# Patient Record
Sex: FEMALE | URBAN - METROPOLITAN AREA
[De-identification: names, ages, dates, MRNs, and addresses within clinical notes are randomized per-mention and may not be internally consistent; named-entity substitution may affect disease eponyms.]

---

## 2019-01-30 ENCOUNTER — APPOINTMENT (RX ONLY)
Dept: URBAN - METROPOLITAN AREA CLINIC 176 | Facility: CLINIC | Age: 34
Setting detail: DERMATOLOGY
End: 2019-01-30

## 2019-01-30 VITALS — WEIGHT: 110 LBS | HEIGHT: 62 IN

## 2019-01-30 DIAGNOSIS — L57.8 OTHER SKIN CHANGES DUE TO CHRONIC EXPOSURE TO NONIONIZING RADIATION: ICD-10-CM

## 2019-01-30 PROBLEM — D23.5 OTHER BENIGN NEOPLASM OF SKIN OF TRUNK: Status: ACTIVE | Noted: 2019-01-30

## 2019-01-30 PROCEDURE — ? SUNSCREEN RECOMMENDATIONS

## 2019-01-30 PROCEDURE — ? SHAVE REMOVAL (NO PATHOLOGY)

## 2019-01-30 PROCEDURE — ? ADDITIONAL NOTES

## 2019-01-30 ASSESSMENT — LOCATION SIMPLE DESCRIPTION DERM
LOCATION SIMPLE: RIGHT CHEEK
LOCATION SIMPLE: LEFT CHEEK
LOCATION SIMPLE: LEFT FOREARM
LOCATION SIMPLE: RIGHT FOREARM

## 2019-01-30 ASSESSMENT — LOCATION DETAILED DESCRIPTION DERM
LOCATION DETAILED: RIGHT INFERIOR CENTRAL MALAR CHEEK
LOCATION DETAILED: LEFT CENTRAL MALAR CHEEK
LOCATION DETAILED: LEFT PROXIMAL DORSAL FOREARM
LOCATION DETAILED: RIGHT DISTAL RADIAL DORSAL FOREARM

## 2019-01-30 ASSESSMENT — LOCATION ZONE DERM
LOCATION ZONE: ARM
LOCATION ZONE: FACE

## 2019-01-30 NOTE — HPI: SCAR (COMPLEX), CHEST
How Severe Is The Scar?: moderate
What Are Your Concerns? (Check All That Apply): size
Is This A New Presentation, Or A Follow-Up?: Chest Scar

## 2019-01-30 NOTE — PROCEDURE: SHAVE REMOVAL (NO PATHOLOGY)
Hemostasis: Electrocautery
Bill For Surgical Tray: no
Detail Level: Simple
Path Notes (To The Dermatopathologist): Please check margins.
Medical Necessity Information: It is in your best interest to select a reason for this procedure from the list below. All of these items fulfill various CMS LCD requirements except the new and changing color options.
Consent was obtained from the patient. The risks and benefits to therapy were discussed in detail. Specifically, the risks of infection, scarring, bleeding, prolonged wound healing, incomplete removal, allergy to anesthesia, nerve injury and recurrence were addressed. Prior to the procedure, the treatment site was clearly identified and confirmed by the patient. All components of Universal Protocol/PAUSE Rule completed.
X Size Of Lesion In Cm (Optional): 0
Medical Necessity Clause: This procedure was medically necessary because the lesion that was treated was:
Anesthesia Type: 1% lidocaine with epinephrine
Wound Care: Petrolatum
Render Post-Care Instructions In Note?: yes
Post-Care Instructions: I reviewed with the patient in detail post-care instructions. Patient is to keep the biopsy site dry overnight, and then apply bacitracin twice daily until healed. Patient may apply hydrogen peroxide soaks to remove any crusting.

## 2019-01-30 NOTE — PROCEDURE: ADDITIONAL NOTES
Additional Notes: Benign nevus previously shave removed - patient feels it may not have been fully removed or may be regrowing.  Reshave today for no charge.
Detail Level: Zone